# Patient Record
Sex: FEMALE | Race: WHITE | Employment: STUDENT | URBAN - METROPOLITAN AREA
[De-identification: names, ages, dates, MRNs, and addresses within clinical notes are randomized per-mention and may not be internally consistent; named-entity substitution may affect disease eponyms.]

---

## 2021-02-12 ENCOUNTER — HOSPITAL ENCOUNTER (OUTPATIENT)
Age: 21
Discharge: HOME | End: 2021-02-12
Payer: COMMERCIAL

## 2021-02-12 VITALS
SYSTOLIC BLOOD PRESSURE: 144 MMHG | DIASTOLIC BLOOD PRESSURE: 85 MMHG | HEART RATE: 108 BPM | TEMPERATURE: 99 F | OXYGEN SATURATION: 99 % | RESPIRATION RATE: 19 BRPM

## 2021-02-12 DIAGNOSIS — Z20.822 EXPOSURE TO COVID-19 VIRUS: Primary | ICD-10-CM

## 2021-02-12 LAB — SARS-COV-2 RNA RESP QL NAA+PROBE: NOT DETECTED

## 2021-02-12 PROCEDURE — 99202 OFFICE O/P NEW SF 15 MIN: CPT | Performed by: NURSE PRACTITIONER

## 2021-02-12 PROCEDURE — U0002 COVID-19 LAB TEST NON-CDC: HCPCS | Performed by: NURSE PRACTITIONER

## 2021-02-12 NOTE — ED INITIAL ASSESSMENT (HPI)
Pt here for covid testing, pt had a family member who tested +covid today , pt states she had a cough and congestion  About 4 days ago, pt denies any fevers

## 2021-02-13 NOTE — ED PROVIDER NOTES
Patient Seen in: [facility]      History   Patient presents with:  Testing    Stated Complaint: testing    HPI/Subjective: Well appearing 22 y/o female presents for COVID testing.  Pt communicates that her step-father was dx with COVID [facility] Reviewed   RAPID SARS-COV-2 BY PCR - Normal                MDM   Well appearing   Rapid SARS-CoV-2 negative   02 saturation 99%  No respiratory distress      COVID-19 counseling performed  Discussed quarantine precautions   Reviewed the signs and

## (undated) NOTE — LETTER
[facility] [id number]  [phone]     Patient: Angel Hanson   YOB: 2000   Date of Visit: 2/12/2021     Dear Employer,        February 12, 2021    At [facility], we are taking special p Persons infected with SARS-CoV-2 who never develop COVID-19 symptoms may discontinue isolation and other precautions 10 days after the date of their first positive RT-PCR test for SARS-CoV-2 RNA.     Persons who are asymptomatic but have been exposed, CDC r